# Patient Record
Sex: FEMALE | Race: OTHER | NOT HISPANIC OR LATINO
[De-identification: names, ages, dates, MRNs, and addresses within clinical notes are randomized per-mention and may not be internally consistent; named-entity substitution may affect disease eponyms.]

---

## 2017-05-24 ENCOUNTER — RESULT REVIEW (OUTPATIENT)
Age: 42
End: 2017-05-24

## 2018-07-25 ENCOUNTER — RESULT REVIEW (OUTPATIENT)
Age: 43
End: 2018-07-25

## 2019-10-26 ENCOUNTER — EMERGENCY (EMERGENCY)
Facility: HOSPITAL | Age: 44
LOS: 1 days | Discharge: HOME | End: 2019-10-26
Attending: EMERGENCY MEDICINE
Payer: COMMERCIAL

## 2019-10-26 VITALS
HEIGHT: 64 IN | RESPIRATION RATE: 18 BRPM | WEIGHT: 139.99 LBS | DIASTOLIC BLOOD PRESSURE: 79 MMHG | OXYGEN SATURATION: 100 % | TEMPERATURE: 98 F | SYSTOLIC BLOOD PRESSURE: 129 MMHG | HEART RATE: 78 BPM

## 2019-10-26 PROCEDURE — 12001 RPR S/N/AX/GEN/TRNK 2.5CM/<: CPT

## 2019-10-26 PROCEDURE — 99282 EMERGENCY DEPT VISIT SF MDM: CPT | Mod: 25

## 2019-10-26 PROCEDURE — 99283 EMERGENCY DEPT VISIT LOW MDM: CPT | Mod: 25

## 2019-10-26 PROCEDURE — 90471 IMMUNIZATION ADMIN: CPT

## 2019-10-26 PROCEDURE — 90715 TDAP VACCINE 7 YRS/> IM: CPT

## 2019-10-26 RX ORDER — TETANUS TOXOID, REDUCED DIPHTHERIA TOXOID AND ACELLULAR PERTUSSIS VACCINE, ADSORBED 5; 2.5; 8; 8; 2.5 [IU]/.5ML; [IU]/.5ML; UG/.5ML; UG/.5ML; UG/.5ML
0.5 SUSPENSION INTRAMUSCULAR ONCE
Refills: 0 | Status: COMPLETED | OUTPATIENT
Start: 2019-10-26 | End: 2019-10-26

## 2019-10-26 RX ADMIN — TETANUS TOXOID, REDUCED DIPHTHERIA TOXOID AND ACELLULAR PERTUSSIS VACCINE, ADSORBED 0.5 MILLILITER(S): 5; 2.5; 8; 8; 2.5 SUSPENSION INTRAMUSCULAR at 19:12

## 2019-10-26 NOTE — ED PROVIDER NOTE - ATTENDING CONTRIBUTION TO CARE
Patient presenting complaining of R elbow laceration earlier today.  Located over medial condyle approx 1cm on exam, shallow, no joint space involvement on exam, full ROM of elbow.  Tetanus up to date.  Will washout and repair.

## 2019-10-26 NOTE — ED PROVIDER NOTE - PATIENT PORTAL LINK FT
You can access the FollowMyHealth Patient Portal offered by [facility] by registering at the following website: [email or web address]. By joining [facility] FollowMyHealth portal, you will also be able to view your health information using other applications (apps) compatible with our system.

## 2019-10-26 NOTE — ED PROVIDER NOTE - CLINICAL SUMMARY MEDICAL DECISION MAKING FREE TEXT BOX
44yo female with no significant pmh presenting with 2 cm laceration to medial aspect of right elbow.  No complications and no imaging required.  No suspicion for foreign body.  Will repair laceration and discharge home.

## 2019-10-26 NOTE — ED ADULT NURSE NOTE - OBJECTIVE STATEMENT
42 y/o female with no PMH presents to the ED  from home c/o laceration. Patient states that she was trimming raza this afternoon with a knife and had tripped and had cut herself on her right elbow. Patient states that she is having mild pain. Patient denies hitting head/LOC.  Denies fever, chills, n/v, weakness, abd pain, diarrhea/constipation, numbness/tingling, urinary s/s. Patient A&Ox3, in no respiratory distress, and denies chest pain. Laceration present to right elbow. Mild bleeding at site.

## 2019-10-26 NOTE — ED PROVIDER NOTE - OBJECTIVE STATEMENT
42yo female presenting with laceration to medial aspect of right elbow.  Patient was cutting raza with knife when she dropped it and was cut as it fell.  Denies fevers, chills, n/v, cp, sob, abdominal pain, weakness, numbness, tingling, loss of sensation.  Denies falls or head trauma.  Last tdap unknown.

## 2019-10-26 NOTE — ED PROVIDER NOTE - PHYSICAL EXAMINATION
General appearance: NAD, conversant, afebrile    Eyes: anicteric sclerae   HENT: Atraumatic   Neck: Trachea midline; Full range of motion   Extremities: No peripheral edema,  2+ peripheral pulses, gross sensation intact, 5/5 strength in all four extremities.   Skin: Dry, normal temperature, turgor and texture; no rash, 2cm laceration to surface of right medial epicondyle    Psych: Appropriate affect, cooperative; alert and oriented to person, place and time

## 2019-11-26 ENCOUNTER — RESULT REVIEW (OUTPATIENT)
Age: 44
End: 2019-11-26

## 2020-08-25 ENCOUNTER — RESULT REVIEW (OUTPATIENT)
Age: 45
End: 2020-08-25

## 2022-12-23 NOTE — ED ADULT TRIAGE NOTE - PRO INTERPRETER NEED 2
English